# Patient Record
Sex: MALE | Race: BLACK OR AFRICAN AMERICAN
[De-identification: names, ages, dates, MRNs, and addresses within clinical notes are randomized per-mention and may not be internally consistent; named-entity substitution may affect disease eponyms.]

---

## 2018-02-11 ENCOUNTER — HOSPITAL ENCOUNTER (EMERGENCY)
Dept: HOSPITAL 87 - ER | Age: 48
Discharge: LEFT BEFORE DISCHARGE | End: 2018-02-11
Payer: MEDICAID

## 2018-02-11 VITALS — SYSTOLIC BLOOD PRESSURE: 126 MMHG | DIASTOLIC BLOOD PRESSURE: 72 MMHG

## 2018-02-11 VITALS — BODY MASS INDEX: 21.22 KG/M2 | WEIGHT: 165.35 LBS | HEIGHT: 74 IN

## 2018-02-11 DIAGNOSIS — S49.81XA: Primary | ICD-10-CM
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
## 2018-02-11 PROCEDURE — 96372 THER/PROPH/DIAG INJ SC/IM: CPT

## 2018-02-11 PROCEDURE — 99283 EMERGENCY DEPT VISIT LOW MDM: CPT

## 2018-02-15 ENCOUNTER — HOSPITAL ENCOUNTER (EMERGENCY)
Dept: HOSPITAL 87 - ER | Age: 48
Discharge: HOME | End: 2018-02-15
Payer: MEDICAID

## 2018-02-15 VITALS — SYSTOLIC BLOOD PRESSURE: 100 MMHG | DIASTOLIC BLOOD PRESSURE: 60 MMHG

## 2018-02-15 VITALS — WEIGHT: 165.35 LBS | BODY MASS INDEX: 24.49 KG/M2 | HEIGHT: 69 IN

## 2018-02-15 DIAGNOSIS — R10.30: ICD-10-CM

## 2018-02-15 DIAGNOSIS — R36.9: ICD-10-CM

## 2018-02-15 DIAGNOSIS — R56.9: ICD-10-CM

## 2018-02-15 DIAGNOSIS — R19.7: ICD-10-CM

## 2018-02-15 DIAGNOSIS — R30.0: ICD-10-CM

## 2018-02-15 DIAGNOSIS — F17.200: ICD-10-CM

## 2018-02-15 DIAGNOSIS — R31.9: ICD-10-CM

## 2018-02-15 DIAGNOSIS — J06.9: Primary | ICD-10-CM

## 2018-02-15 DIAGNOSIS — R11.2: ICD-10-CM

## 2018-02-15 LAB
APPEARANCE UR: CLEAR
CHLORIDE SERPL-SCNC: 103 MEQ/L (ref 98–107)
COLOR UR: YELLOW
ERYTHROCYTE [DISTWIDTH] IN BLOOD BY AUTOMATED COUNT: 13.8 % (ref 11.6–14.6)
HCT VFR BLD AUTO: 41.9 % (ref 42–52)
HGB BLD-MCNC: 14.1 G/DL (ref 14–18)
HGB UR QL STRIP: NEGATIVE
KETONES UR STRIP-MCNC: NEGATIVE MG/DL
LEUKOCYTE ESTERASE UR QL STRIP: NEGATIVE
LYMPHOCYTES NFR BLD MANUAL: 18 % (ref 20–50)
MCH RBC QN AUTO: 33 PG (ref 28–32)
MCV RBC AUTO: 98.2 FL (ref 80–94)
MONOCYTES NFR BLD MANUAL: 21 % (ref 2–8)
NEUTS SEG NFR BLD MANUAL: 61 % (ref 45–75)
NITRITE UR QL STRIP: NEGATIVE
PH UR STRIP: 5.5 [PH] (ref 4.5–8)
PLATELET # BLD AUTO: 149 X1000/UL (ref 130–400)
PLATELET # BLD EST: NORMAL 10*3/UL
PMV BLD AUTO: 9.9 FL (ref 7.4–10.4)
PROT UR QL STRIP: NEGATIVE
RBC # BLD AUTO: 4.27 MILL/UL (ref 4.7–6.1)
SP GR UR STRIP: 1.03 (ref 1–1.03)
UROBILINOGEN UR STRIP-MCNC: 0.2 E.U./DL (ref 0.2–1)

## 2018-02-15 PROCEDURE — 74176 CT ABD & PELVIS W/O CONTRAST: CPT

## 2018-02-15 PROCEDURE — 99285 EMERGENCY DEPT VISIT HI MDM: CPT

## 2018-02-15 PROCEDURE — 80053 COMPREHEN METABOLIC PANEL: CPT

## 2018-02-15 PROCEDURE — 87186 SC STD MICRODIL/AGAR DIL: CPT

## 2018-02-15 PROCEDURE — 87804 INFLUENZA ASSAY W/OPTIC: CPT

## 2018-02-15 PROCEDURE — 36415 COLL VENOUS BLD VENIPUNCTURE: CPT

## 2018-02-15 PROCEDURE — 96375 TX/PRO/DX INJ NEW DRUG ADDON: CPT

## 2018-02-15 PROCEDURE — 71045 X-RAY EXAM CHEST 1 VIEW: CPT

## 2018-02-15 PROCEDURE — 81003 URINALYSIS AUTO W/O SCOPE: CPT

## 2018-02-15 PROCEDURE — 85025 COMPLETE CBC W/AUTO DIFF WBC: CPT

## 2018-02-15 PROCEDURE — 96374 THER/PROPH/DIAG INJ IV PUSH: CPT

## 2018-02-15 PROCEDURE — 96361 HYDRATE IV INFUSION ADD-ON: CPT

## 2018-06-14 ENCOUNTER — HOSPITAL ENCOUNTER (EMERGENCY)
Age: 48
Discharge: HOME | End: 2018-06-14

## 2018-06-14 ENCOUNTER — HOSPITAL ENCOUNTER (EMERGENCY)
Dept: HOSPITAL 91 - E/R | Age: 48
Discharge: HOME | End: 2018-06-14
Payer: COMMERCIAL

## 2018-06-14 DIAGNOSIS — R40.2142: ICD-10-CM

## 2018-06-14 DIAGNOSIS — F15.10: ICD-10-CM

## 2018-06-14 DIAGNOSIS — R40.2362: ICD-10-CM

## 2018-06-14 DIAGNOSIS — R07.9: Primary | ICD-10-CM

## 2018-06-14 DIAGNOSIS — R40.2252: ICD-10-CM

## 2018-06-14 DIAGNOSIS — F20.9: ICD-10-CM

## 2018-06-14 LAB
ADD MAN DIFF?: NO
ALANINE AMINOTRANSFERASE: 26 IU/L (ref 13–69)
ALBUMIN/GLOBULIN RATIO: 1.15
ALBUMIN: 3.8 G/DL (ref 3.3–4.9)
ALKALINE PHOSPHATASE: 110 IU/L (ref 42–121)
ANION GAP: 10 (ref 8–16)
ASPARTATE AMINO TRANSFERASE: 13 IU/L (ref 15–46)
BASOPHIL #: 0.1 10^3/UL (ref 0–0.1)
BASOPHILS %: 0.7 % (ref 0–2)
BILIRUBIN,DIRECT: 0 MG/DL (ref 0–0.2)
BILIRUBIN,TOTAL: 0.3 MG/DL (ref 0.2–1.3)
BLOOD UREA NITROGEN: 18 MG/DL (ref 7–20)
CALCIUM: 9.1 MG/DL (ref 8.4–10.2)
CARBON DIOXIDE: 29 MMOL/L (ref 21–31)
CHLORIDE: 104 MMOL/L (ref 97–110)
CREATININE: 0.9 MG/DL (ref 0.61–1.24)
EOSINOPHILS #: 0.2 10^3/UL (ref 0–0.5)
EOSINOPHILS %: 2.5 % (ref 0–7)
GLOBULIN: 3.3 G/DL (ref 1.3–3.2)
GLUCOSE: 83 MG/DL (ref 70–220)
HEMATOCRIT: 41.3 % (ref 42–52)
HEMOGLOBIN: 13.8 G/DL (ref 14–18)
LIPASE: 99 U/L (ref 23–300)
LYMPHOCYTES #: 1.5 10^3/UL (ref 0.8–2.9)
LYMPHOCYTES %: 19.8 % (ref 15–51)
MEAN CORPUSCULAR HEMOGLOBIN: 32.9 PG (ref 29–33)
MEAN CORPUSCULAR HGB CONC: 33.4 G/DL (ref 32–37)
MEAN CORPUSCULAR VOLUME: 98.3 FL (ref 82–101)
MEAN PLATELET VOLUME: 11.3 FL (ref 7.4–10.4)
MONOCYTE #: 0.9 10^3/UL (ref 0.3–0.9)
MONOCYTES %: 11.6 % (ref 0–11)
NEUTROPHIL #: 4.8 10^3/UL (ref 1.6–7.5)
NEUTROPHILS %: 65 % (ref 39–77)
NUCLEATED RED BLOOD CELLS #: 0 10^3/UL (ref 0–0)
NUCLEATED RED BLOOD CELLS%: 0 /100WBC (ref 0–0)
PLATELET COUNT: 217 10^3/UL (ref 140–415)
POTASSIUM: 4.1 MMOL/L (ref 3.5–5.1)
RED BLOOD COUNT: 4.2 10^6/UL (ref 4.7–6.1)
RED CELL DISTRIBUTION WIDTH: 12.2 % (ref 11.5–14.5)
SODIUM: 139 MMOL/L (ref 135–144)
TOTAL PROTEIN: 7.1 G/DL (ref 6.1–8.1)
TROPONIN-I: < 0.01 NG/ML (ref 0–0.12)
WHITE BLOOD COUNT: 7.3 10^3/UL (ref 4.8–10.8)

## 2018-06-14 PROCEDURE — 83690 ASSAY OF LIPASE: CPT

## 2018-06-14 PROCEDURE — 36415 COLL VENOUS BLD VENIPUNCTURE: CPT

## 2018-06-14 PROCEDURE — 99285 EMERGENCY DEPT VISIT HI MDM: CPT

## 2018-06-14 PROCEDURE — 71045 X-RAY EXAM CHEST 1 VIEW: CPT

## 2018-06-14 PROCEDURE — 85025 COMPLETE CBC W/AUTO DIFF WBC: CPT

## 2018-06-14 PROCEDURE — 84484 ASSAY OF TROPONIN QUANT: CPT

## 2018-06-14 PROCEDURE — 93005 ELECTROCARDIOGRAM TRACING: CPT

## 2018-06-14 PROCEDURE — 80053 COMPREHEN METABOLIC PANEL: CPT

## 2018-06-14 RX ADMIN — THIAMINE HYDROCHLORIDE 1 MLS/HR: 100 INJECTION, SOLUTION INTRAMUSCULAR; INTRAVENOUS at 13:45

## 2020-05-25 ENCOUNTER — HOSPITAL ENCOUNTER (EMERGENCY)
Dept: HOSPITAL 54 - ER | Age: 50
LOS: 1 days | Discharge: PSYCHIATRIC OR SUBSTANCE USE FACILITY | End: 2020-05-26
Payer: COMMERCIAL

## 2020-05-25 VITALS — BODY MASS INDEX: 25.48 KG/M2 | HEIGHT: 69 IN | WEIGHT: 172 LBS

## 2020-05-25 DIAGNOSIS — F20.9: ICD-10-CM

## 2020-05-25 DIAGNOSIS — G40.909: ICD-10-CM

## 2020-05-25 DIAGNOSIS — F32.9: ICD-10-CM

## 2020-05-25 DIAGNOSIS — F19.10: ICD-10-CM

## 2020-05-25 DIAGNOSIS — R45.851: Primary | ICD-10-CM

## 2020-05-25 LAB
ALBUMIN SERPL BCP-MCNC: 3.8 G/DL (ref 3.4–5)
ALP SERPL-CCNC: 94 U/L (ref 46–116)
ALT SERPL W P-5'-P-CCNC: 45 U/L (ref 12–78)
APAP SERPL-MCNC: 0 UG/ML (ref 10–30)
AST SERPL W P-5'-P-CCNC: 87 U/L (ref 15–37)
BASOPHILS # BLD AUTO: 0.1 /CMM (ref 0–0.2)
BASOPHILS NFR BLD AUTO: 1.2 % (ref 0–2)
BILIRUB DIRECT SERPL-MCNC: 0.1 MG/DL (ref 0–0.2)
BILIRUB SERPL-MCNC: 0.3 MG/DL (ref 0.2–1)
BUN SERPL-MCNC: 15 MG/DL (ref 7–18)
CALCIUM SERPL-MCNC: 8.8 MG/DL (ref 8.5–10.1)
CHLORIDE SERPL-SCNC: 105 MMOL/L (ref 98–107)
CO2 SERPL-SCNC: 29 MMOL/L (ref 21–32)
CREAT SERPL-MCNC: 1.2 MG/DL (ref 0.6–1.3)
EOSINOPHIL NFR BLD AUTO: 3.4 % (ref 0–6)
ETHANOL SERPL-MCNC: < 3 MG/DL (ref 0–0)
GLUCOSE SERPL-MCNC: 97 MG/DL (ref 74–106)
HCT VFR BLD AUTO: 40 % (ref 39–51)
HGB BLD-MCNC: 13.3 G/DL (ref 13.5–17.5)
LYMPHOCYTES NFR BLD AUTO: 1.9 /CMM (ref 0.8–4.8)
LYMPHOCYTES NFR BLD AUTO: 35 % (ref 20–44)
MCHC RBC AUTO-ENTMCNC: 34 G/DL (ref 31–36)
MCV RBC AUTO: 100 FL (ref 80–96)
MONOCYTES NFR BLD AUTO: 0.5 /CMM (ref 0.1–1.3)
MONOCYTES NFR BLD AUTO: 9.3 % (ref 2–12)
NEUTROPHILS # BLD AUTO: 2.8 /CMM (ref 1.8–8.9)
NEUTROPHILS NFR BLD AUTO: 51.1 % (ref 43–81)
PH UR STRIP: 7 [PH] (ref 5–8)
PLATELET # BLD AUTO: 207 /CMM (ref 150–450)
POTASSIUM SERPL-SCNC: 4 MMOL/L (ref 3.5–5.1)
PROT SERPL-MCNC: 6.7 G/DL (ref 6.4–8.2)
RBC # BLD AUTO: 3.98 MIL/UL (ref 4.5–6)
SALICYLATES SERPL-MCNC: 3.1 MG/DL (ref 2.8–20)
SODIUM SERPL-SCNC: 141 MMOL/L (ref 136–145)
UROBILINOGEN UR STRIP-MCNC: >=8 EU/DL
WBC NRBC COR # BLD AUTO: 5.5 K/UL (ref 4.3–11)

## 2020-05-25 PROCEDURE — 80076 HEPATIC FUNCTION PANEL: CPT

## 2020-05-25 PROCEDURE — 99285 EMERGENCY DEPT VISIT HI MDM: CPT

## 2020-05-25 PROCEDURE — 85025 COMPLETE CBC W/AUTO DIFF WBC: CPT

## 2020-05-25 PROCEDURE — 81001 URINALYSIS AUTO W/SCOPE: CPT

## 2020-05-25 PROCEDURE — 80305 DRUG TEST PRSMV DIR OPT OBS: CPT

## 2020-05-25 PROCEDURE — G0480 DRUG TEST DEF 1-7 CLASSES: HCPCS

## 2020-05-25 PROCEDURE — 80329 ANALGESICS NON-OPIOID 1 OR 2: CPT

## 2020-05-25 PROCEDURE — 80307 DRUG TEST PRSMV CHEM ANLYZR: CPT

## 2020-05-25 PROCEDURE — 36415 COLL VENOUS BLD VENIPUNCTURE: CPT

## 2020-05-25 PROCEDURE — 80048 BASIC METABOLIC PNL TOTAL CA: CPT

## 2020-05-25 NOTE — NUR
PT CAME TO THE ED C/O SI W/ A PLAN TO OVERDOSE HIMSELF W/ HIS MEDICATIONS. 
DENIES HI. PT AAOX4, RESPIRATIONS EVEN AND UNLABORED ON RA W/ NAD NOTED. PT 
CHANGED INTO GOWN, CONNECTED TO THE MONITOR, BELONGINGS TO LOCKER, SUICIDE 
PRECAUTIONS IMPLEMENTED. SITTER AT BEDSIDE FOR SAFETY

## 2020-05-26 VITALS — DIASTOLIC BLOOD PRESSURE: 91 MMHG | SYSTOLIC BLOOD PRESSURE: 137 MMHG

## 2020-05-26 NOTE — NUR
CALLED [facility] TO FOLLOW UP ABOUT ETA [phone] OPT[facility] 2 SPOKE WITH [facility].

## 2020-05-26 NOTE — NUR
CALLED [facility] FOR FOLLOW UP ON ETA ON BLS TRANSPORT. STATED THE BLS TRANSPORT 
THAT IS SUPPOSED TO  OUR PATIENT IS STILL AT [facility] 
TRANSPORTING ANOTHER PT THAT WE SENT, THEYRE WAITING TO TEST THEIR PT FOR 
COVID. ALSO STATED THAT ONCE THEY TRANSFER THEIR PT THEY WILL COME  OUR 
PT.

## 2020-05-26 NOTE — NUR
PT ACCEPTED AT [facility]

PHONE NUMBER FOR REPORT [phone] EXT [phone]

ACCEPTING [facility]

CALL REPORT TO UBALDO BONILLA

## 2020-07-07 ENCOUNTER — HOSPITAL ENCOUNTER (EMERGENCY)
Dept: HOSPITAL 54 - ER | Age: 50
Discharge: PSYCHIATRIC OR SUBSTANCE USE FACILITY | End: 2020-07-07
Payer: COMMERCIAL

## 2020-07-07 VITALS — WEIGHT: 160 LBS | HEIGHT: 70 IN | BODY MASS INDEX: 22.9 KG/M2

## 2020-07-07 VITALS — SYSTOLIC BLOOD PRESSURE: 122 MMHG | DIASTOLIC BLOOD PRESSURE: 68 MMHG
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
## 2020-07-07 DIAGNOSIS — R56.9: ICD-10-CM

## 2020-07-07 DIAGNOSIS — R45.851: ICD-10-CM

## 2020-07-07 DIAGNOSIS — F12.10: ICD-10-CM

## 2020-07-07 DIAGNOSIS — Z59.0: ICD-10-CM

## 2020-07-07 DIAGNOSIS — T73.0XXA: Primary | ICD-10-CM

## 2020-07-07 DIAGNOSIS — F14.10: ICD-10-CM

## 2020-07-07 DIAGNOSIS — F20.0: ICD-10-CM

## 2020-07-07 DIAGNOSIS — F15.10: ICD-10-CM

## 2020-07-07 LAB
ALBUMIN SERPL BCP-MCNC: 3.8 G/DL (ref 3.4–5)
ALP SERPL-CCNC: 97 U/L (ref 46–116)
ALT SERPL W P-5'-P-CCNC: 38 U/L (ref 12–78)
APAP SERPL-MCNC: 0 UG/ML (ref 10–30)
AST SERPL W P-5'-P-CCNC: 15 U/L (ref 15–37)
BASOPHILS # BLD AUTO: 0.1 /CMM (ref 0–0.2)
BASOPHILS NFR BLD AUTO: 1.2 % (ref 0–2)
BILIRUB DIRECT SERPL-MCNC: 0.1 MG/DL (ref 0–0.2)
BILIRUB SERPL-MCNC: 0.4 MG/DL (ref 0.2–1)
BUN SERPL-MCNC: 16 MG/DL (ref 7–18)
CALCIUM SERPL-MCNC: 9 MG/DL (ref 8.5–10.1)
CHLORIDE SERPL-SCNC: 104 MMOL/L (ref 98–107)
CO2 SERPL-SCNC: 30 MMOL/L (ref 21–32)
CREAT SERPL-MCNC: 1.1 MG/DL (ref 0.6–1.3)
EOSINOPHIL NFR BLD AUTO: 4 % (ref 0–6)
ETHANOL SERPL-MCNC: < 3 MG/DL (ref 0–0)
GLUCOSE SERPL-MCNC: 88 MG/DL (ref 74–106)
HCT VFR BLD AUTO: 41 % (ref 39–51)
HGB BLD-MCNC: 14 G/DL (ref 13.5–17.5)
LYMPHOCYTES NFR BLD AUTO: 1.3 /CMM (ref 0.8–4.8)
LYMPHOCYTES NFR BLD AUTO: 30.8 % (ref 20–44)
MCHC RBC AUTO-ENTMCNC: 34 G/DL (ref 31–36)
MCV RBC AUTO: 101 FL (ref 80–96)
MONOCYTES NFR BLD AUTO: 0.5 /CMM (ref 0.1–1.3)
MONOCYTES NFR BLD AUTO: 12.7 % (ref 2–12)
NEUTROPHILS # BLD AUTO: 2.2 /CMM (ref 1.8–8.9)
NEUTROPHILS NFR BLD AUTO: 51.3 % (ref 43–81)
PLATELET # BLD AUTO: 205 /CMM (ref 150–450)
POTASSIUM SERPL-SCNC: 3.3 MMOL/L (ref 3.5–5.1)
PROT SERPL-MCNC: 7 G/DL (ref 6.4–8.2)
RBC # BLD AUTO: 4.11 MIL/UL (ref 4.5–6)
SALICYLATES SERPL-MCNC: 2.6 MG/DL (ref 2.8–20)
SODIUM SERPL-SCNC: 141 MMOL/L (ref 136–145)
WBC NRBC COR # BLD AUTO: 4.3 K/UL (ref 4.3–11)

## 2020-07-07 PROCEDURE — 80329 ANALGESICS NON-OPIOID 1 OR 2: CPT

## 2020-07-07 PROCEDURE — 85025 COMPLETE CBC W/AUTO DIFF WBC: CPT

## 2020-07-07 PROCEDURE — 36415 COLL VENOUS BLD VENIPUNCTURE: CPT

## 2020-07-07 PROCEDURE — 80048 BASIC METABOLIC PNL TOTAL CA: CPT

## 2020-07-07 PROCEDURE — 99285 EMERGENCY DEPT VISIT HI MDM: CPT

## 2020-07-07 PROCEDURE — 80305 DRUG TEST PRSMV DIR OPT OBS: CPT

## 2020-07-07 PROCEDURE — G0480 DRUG TEST DEF 1-7 CLASSES: HCPCS

## 2020-07-07 PROCEDURE — 80076 HEPATIC FUNCTION PANEL: CPT

## 2020-07-07 PROCEDURE — 80307 DRUG TEST PRSMV CHEM ANLYZR: CPT

## 2020-07-07 SDOH — ECONOMIC STABILITY - HOUSING INSECURITY: HOMELESSNESS: Z59.0

## 2020-07-07 NOTE — NUR
PT BIB SELF C/O SI " I WANT TO RUN THRU TRAFFIC" PT IS AAOX4, NOT IN 
RESPIRATORY DISTRESS, V/S STABLE, KEPT RESTED AND COMFORTABLE. WILL CONTINUE TO 
MONITOR. SITTER AT BEDSIDE.

## 2020-07-07 NOTE — NUR
Pt is a 50-year-old male who came to [facility] requesting voluntary psychiatric hospitalization. 
Pt reports he is suicidal with a plan. PATRICIOW contacted Vini at [facility] and initiated voluntary 
hospitalization. PATRICIOW conducted chart review and faxed clinicals to [facility] intake. CELSO 
received a callback from Nisha at intake stating she is missing the medical clearance. 
PATRICIOW requested for her to contact ED since medical clearance note was not done as of yet by 
MD. PATRICIOW updated CRN Gener in ED with aforementioned information.

## 2020-08-18 ENCOUNTER — HOSPITAL ENCOUNTER (EMERGENCY)
Dept: HOSPITAL 54 - ER | Age: 50
Discharge: PSYCHIATRIC OR SUBSTANCE USE FACILITY | End: 2020-08-18
Payer: COMMERCIAL

## 2020-08-18 VITALS — HEIGHT: 69 IN | WEIGHT: 160 LBS | BODY MASS INDEX: 23.7 KG/M2

## 2020-08-18 VITALS — DIASTOLIC BLOOD PRESSURE: 69 MMHG | SYSTOLIC BLOOD PRESSURE: 123 MMHG

## 2020-08-18 DIAGNOSIS — F20.9: ICD-10-CM

## 2020-08-18 DIAGNOSIS — R45.851: Primary | ICD-10-CM

## 2020-08-18 LAB
ALBUMIN SERPL BCP-MCNC: 3.8 G/DL (ref 3.4–5)
ALP SERPL-CCNC: 90 U/L (ref 46–116)
ALT SERPL W P-5'-P-CCNC: 18 U/L (ref 12–78)
APAP SERPL-MCNC: 0 UG/ML (ref 10–30)
APPEARANCE UR: CLEAR
AST SERPL W P-5'-P-CCNC: 10 U/L (ref 15–37)
BASOPHILS # BLD AUTO: 0.1 /CMM (ref 0–0.2)
BASOPHILS NFR BLD AUTO: 1.1 % (ref 0–2)
BILIRUB DIRECT SERPL-MCNC: 0.1 MG/DL (ref 0–0.2)
BILIRUB SERPL-MCNC: 0.3 MG/DL (ref 0.2–1)
BILIRUB UR QL STRIP: (no result)
BUN SERPL-MCNC: 15 MG/DL (ref 7–18)
CALCIUM SERPL-MCNC: 8.8 MG/DL (ref 8.5–10.1)
CHLORIDE SERPL-SCNC: 102 MMOL/L (ref 98–107)
CO2 SERPL-SCNC: 24 MMOL/L (ref 21–32)
COLOR UR: YELLOW
CREAT SERPL-MCNC: 1.3 MG/DL (ref 0.6–1.3)
EOSINOPHIL NFR BLD AUTO: 3.3 % (ref 0–6)
ETHANOL SERPL-MCNC: < 3 MG/DL (ref 0–0)
GLUCOSE SERPL-MCNC: 125 MG/DL (ref 74–106)
GLUCOSE UR STRIP-MCNC: NEGATIVE MG/DL
HCT VFR BLD AUTO: 42 % (ref 39–51)
HGB BLD-MCNC: 13.8 G/DL (ref 13.5–17.5)
HGB UR QL STRIP: (no result) ERY/UL
KETONES UR STRIP-MCNC: NEGATIVE MG/DL
LEUKOCYTE ESTERASE UR QL STRIP: NEGATIVE
LYMPHOCYTES NFR BLD AUTO: 1.9 /CMM (ref 0.8–4.8)
LYMPHOCYTES NFR BLD AUTO: 34.2 % (ref 20–44)
MCHC RBC AUTO-ENTMCNC: 33 G/DL (ref 31–36)
MCV RBC AUTO: 100 FL (ref 80–96)
MONOCYTES NFR BLD AUTO: 0.6 /CMM (ref 0.1–1.3)
MONOCYTES NFR BLD AUTO: 11.1 % (ref 2–12)
NEUTROPHILS # BLD AUTO: 2.8 /CMM (ref 1.8–8.9)
NEUTROPHILS NFR BLD AUTO: 50.3 % (ref 43–81)
NITRITE UR QL STRIP: NEGATIVE
PH UR STRIP: 5.5 [PH] (ref 5–8)
PLATELET # BLD AUTO: 230 /CMM (ref 150–450)
POTASSIUM SERPL-SCNC: 3.4 MMOL/L (ref 3.5–5.1)
PROT SERPL-MCNC: 6.9 G/DL (ref 6.4–8.2)
PROT UR QL STRIP: NEGATIVE MG/DL
RBC # BLD AUTO: 4.18 MIL/UL (ref 4.5–6)
RBC #/AREA URNS HPF: (no result) /HPF (ref 0–2)
SALICYLATES SERPL-MCNC: 4.1 MG/DL (ref 2.8–20)
SODIUM SERPL-SCNC: 137 MMOL/L (ref 136–145)
UROBILINOGEN UR STRIP-MCNC: 1 EU/DL
WBC #/AREA URNS HPF: (no result) /HPF (ref 0–3)
WBC NRBC COR # BLD AUTO: 5.7 K/UL (ref 4.3–11)

## 2020-08-18 PROCEDURE — 36415 COLL VENOUS BLD VENIPUNCTURE: CPT

## 2020-08-18 PROCEDURE — 80305 DRUG TEST PRSMV DIR OPT OBS: CPT

## 2020-08-18 PROCEDURE — 80048 BASIC METABOLIC PNL TOTAL CA: CPT

## 2020-08-18 PROCEDURE — 81001 URINALYSIS AUTO W/SCOPE: CPT

## 2020-08-18 PROCEDURE — 99285 EMERGENCY DEPT VISIT HI MDM: CPT

## 2020-08-18 PROCEDURE — 80329 ANALGESICS NON-OPIOID 1 OR 2: CPT

## 2020-08-18 PROCEDURE — G0480 DRUG TEST DEF 1-7 CLASSES: HCPCS

## 2020-08-18 PROCEDURE — 80307 DRUG TEST PRSMV CHEM ANLYZR: CPT

## 2020-08-18 PROCEDURE — 80076 HEPATIC FUNCTION PANEL: CPT

## 2020-08-18 PROCEDURE — 85025 COMPLETE CBC W/AUTO DIFF WBC: CPT

## 2020-08-18 NOTE — NUR
ASSESSED PT ON BED ASLEEP BUT EASILY AROUSABLE. NOT IN RESPIRATORY DISTRESS, 
V/S STABLE, KEPT RESTED AND COMFORTABLE. WILL CONTINUE TO MONITOR.

## 2020-08-18 NOTE — NUR
8:45am This SW faxed over clinicals to Vini at [facility] [phone]. This SW 
received confirmation of the fax. This SW awaiting confirmation of bed availability.

## 2020-08-18 NOTE — NUR
BIBSELF C/O SI TO JUMP INFRONT OF TRAFFIC. -HI. PT AAOX4. AMBULATORY WITH 
STEADY GAIT. PLACED IN BED 15, ON MONITOR, AND PULSE OX. PT BELONINGS PLACED IN 
LOCKER.

## 2020-08-18 NOTE — NUR
8:30am: 



 met with the patient at bedside. Patient is a 50-year-old  
Male. Patient was alert and orientated x4 and receptive to speaking with this . 
Patient made little eye contact with this  and when redirected to speak up 
patient did so. Patient was able to confirm demographics on the face sheet. Patient resides 
with his father Gene at [address] [place] [address]. Patient reports the use of 
a wheelchair in the home as he has pain in his leg, patient does not report having a walker 
or a cane in addition to the wheelchair. Patient currently receives [employer] benefits, 
approximately $200. Patient reports alcohol use, currently drinking once a day approximately 
drinking 15 beers. Patient reports the use of marijuana every day. Patient also reports 
smoking cigarettes, 2 packs a day. Patient reports a schizophrenic diagnosis currently 
taking Abilify and Seroquel. Patient reports visual hallucinations to kill my mom and pt 
reports audio hallucinations to kill myself. Patients presents with a suicidal ideation to 
run into traffic. Patient denies having homicidal ideations outside of visual 
hallucinations. This SW spoke to the patient about treatment plan and patient agreed to 
treatment including a voluntary psychiatric admission. 



This SW to remain available for all patient needs.

## 2020-08-18 NOTE — NUR
SPOKE TO BIRD FROM [facility] TRANSPORT ARRANGED, CONFIRMATION NUMBER: 
[phone]

WAITING FOR CALL BACK FOR ETA.

## 2020-08-18 NOTE — NUR
called [facility], spoke with Daquan, said they received fax but will 
follow up with bed availability

## 2021-06-27 NOTE — NUR
PT UPON DC FOR CELLULITIS. PT STATED THAT HE'S SUICIDAL & THOUGHTS OF OD ON 
PILLS, NOTIFIED ERMJACINTA. SITTER AT BS. PT CALM & COOPERATIVE AT THIS TIME. WILL 
CONT TO MONITOR.

## 2021-06-27 NOTE — NUR
PT WATCHING TV. RR EVEN & UNLABORED. CALM & COOPERATIVE, NAD NOTED AT THIS 
TIME. SITTER AT BS. WILL CONT TO MONITOR.

## 2021-07-28 NOTE — NUR
PT SELF PRESENTS TO ED. STEADY GAIT. C/O SUICIDAL IDEATION X 1 WEEK. DENIES HI. 
DENIES ANY PAIN OR ANY DISCOMFORT PTA. WILLING TO GO VOLUNTARY TO A PSYCH 
FACILITY. STATES NOT ON ANY CURRENT MEDICATION. STABLE VITALS. COOPERATIVE TO 
STAFF. AWAITING MD ALEX.

## 2021-07-28 NOTE — NUR
TRANSFER INFORMATION

PT ACCEPTED AT [facility]

ACCEPTING MD FRAGA

PHONE NUMBER FOR REPORT [phone]

PT WILL GO TO UNIT 2

## 2021-07-28 NOTE — NUR
Report given to Lucie CONNER for jaron

-------------------------------------------------------------------------------

Addendum: 07/28/21 at 2056 by DAMEON

-------------------------------------------------------------------------------

From [facility]

## 2021-08-24 NOTE — NUR
TO ER BED 15 REQUESTING MEDICAL CLEARANCE FOR VOLUNTARY PSYCH ADMISSION. PT C/O 
SI WITH PLAN TO RUN INTO TRAFFIC. DENIES HI. PT AAOX4 NO ACUTE DISTRESS NOTED, 
RESP EVEN AND UNLABORED. PT CALM AND COOPERATIVE AT THIS TIME. PLACE PT ON 
HOSPITAL GOWN, ALL BELONGINGS REMOVED AND PLACED IN A LOCKED HOSPITAL LOCKER. 
1:1 SITTER AT BEDSIDE FOR PT SAFETY.

## 2021-08-24 NOTE — NUR
PT ASLEEP, EASILY AROUSABLE. NO ACUTE DISTRESS NOTED, RESP EVEN AND UNLABORED. 
NO PAIN OR DISCOMFORT AT THIS TIME. PT STILL UNABLE TO PROVIDE URINE SAMPLE AT 
THIS TIME. ER MD MADE AWARE.
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
## 2021-08-25 NOTE — NUR
Report given to emts. patient transferred to [facility] in stable 
condition. Belongings given to patient and EMT.

## 2021-08-25 NOTE — NUR
Noted left arm has a dry wound with stiches. The patient did not let further 
evaluation of the wound but stated that there are 7 stiches that were placed 
yesterday at a hospital in [place]. According to the patient he obtained the 
cut on his left arm after having a fall. The patient states that it was an 
accident. Denies pain. No s/s infection noted. ER Dr Santoro was made aware of 
the wound on the arm (see MD notes).
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
## 2021-09-18 NOTE — NUR
patient left in stable condition, denies any pain, picked up by private 
ambulance going to [facility].

## 2022-05-22 ENCOUNTER — HOSPITAL ENCOUNTER (EMERGENCY)
Dept: HOSPITAL 54 - ER | Age: 52
Discharge: PSYCHIATRIC OR SUBSTANCE USE FACILITY | End: 2022-05-22
Payer: COMMERCIAL

## 2022-05-22 VITALS — BODY MASS INDEX: 23.91 KG/M2 | WEIGHT: 167 LBS | HEIGHT: 70 IN

## 2022-05-22 VITALS — SYSTOLIC BLOOD PRESSURE: 130 MMHG | DIASTOLIC BLOOD PRESSURE: 77 MMHG

## 2022-05-22 DIAGNOSIS — F32.A: ICD-10-CM

## 2022-05-22 DIAGNOSIS — F20.0: ICD-10-CM

## 2022-05-22 DIAGNOSIS — R45.851: Primary | ICD-10-CM

## 2022-05-22 DIAGNOSIS — F19.10: ICD-10-CM

## 2022-05-22 DIAGNOSIS — F17.200: ICD-10-CM

## 2022-05-22 LAB
ALBUMIN SERPL BCP-MCNC: 4.4 G/DL (ref 3.4–5)
ALP SERPL-CCNC: 118 U/L (ref 46–116)
ALT SERPL W P-5'-P-CCNC: 18 U/L (ref 12–78)
APAP SERPL-MCNC: 0 UG/ML (ref 10–30)
AST SERPL W P-5'-P-CCNC: 8 U/L (ref 15–37)
BASOPHILS # BLD AUTO: 0.1 K/UL (ref 0–0.2)
BASOPHILS NFR BLD AUTO: 3 % (ref 0–2)
BILIRUB DIRECT SERPL-MCNC: 0.1 MG/DL (ref 0–0.2)
BILIRUB SERPL-MCNC: 0.4 MG/DL (ref 0.2–1)
BILIRUB UR QL STRIP: (no result)
BUN SERPL-MCNC: 16 MG/DL (ref 7–18)
CALCIUM SERPL-MCNC: 9.4 MG/DL (ref 8.5–10.1)
CHLORIDE SERPL-SCNC: 104 MMOL/L (ref 98–107)
CO2 SERPL-SCNC: 31 MMOL/L (ref 21–32)
COLOR UR: (no result)
CREAT SERPL-MCNC: 1.3 MG/DL (ref 0.6–1.3)
EOSINOPHIL NFR BLD AUTO: 6.4 % (ref 0–6)
ETHANOL SERPL-MCNC: < 3 MG/DL (ref 0–0)
GLUCOSE SERPL-MCNC: 69 MG/DL (ref 74–106)
GLUCOSE UR STRIP-MCNC: NEGATIVE MG/DL
HCT VFR BLD AUTO: 44 % (ref 39–51)
HGB BLD-MCNC: 14.6 G/DL (ref 13.5–17.5)
LEUKOCYTE ESTERASE UR QL STRIP: NEGATIVE
LYMPHOCYTES NFR BLD AUTO: 1.2 K/UL (ref 0.8–4.8)
LYMPHOCYTES NFR BLD AUTO: 38 % (ref 20–44)
MCHC RBC AUTO-ENTMCNC: 33 G/DL (ref 31–36)
MCV RBC AUTO: 101 FL (ref 80–96)
MONOCYTES NFR BLD AUTO: 0.4 K/UL (ref 0.1–1.3)
MONOCYTES NFR BLD AUTO: 13.5 % (ref 2–12)
NEUTROPHILS # BLD AUTO: 1.2 K/UL (ref 1.8–8.9)
NEUTROPHILS NFR BLD AUTO: 39.1 % (ref 43–81)
NITRITE UR QL STRIP: NEGATIVE
PH UR STRIP: 5.5 [PH] (ref 5–8)
PLATELET # BLD AUTO: 215 K/UL (ref 150–450)
POTASSIUM SERPL-SCNC: 3.8 MMOL/L (ref 3.5–5.1)
PROT SERPL-MCNC: 8.1 G/DL (ref 6.4–8.2)
PROT UR QL STRIP: NEGATIVE MG/DL
RBC # BLD AUTO: 4.39 MIL/UL (ref 4.5–6)
RBC #/AREA URNS HPF: (no result) /HPF (ref 0–2)
SODIUM SERPL-SCNC: 140 MMOL/L (ref 136–145)
UROBILINOGEN UR STRIP-MCNC: 1 EU/DL
WBC #/AREA URNS HPF: (no result) /HPF (ref 0–3)
WBC NRBC COR # BLD AUTO: 3.1 K/UL (ref 4.3–11)

## 2022-05-22 PROCEDURE — 85025 COMPLETE CBC W/AUTO DIFF WBC: CPT

## 2022-05-22 PROCEDURE — 36415 COLL VENOUS BLD VENIPUNCTURE: CPT

## 2022-05-22 PROCEDURE — 80320 DRUG SCREEN QUANTALCOHOLS: CPT

## 2022-05-22 PROCEDURE — G0480 DRUG TEST DEF 1-7 CLASSES: HCPCS

## 2022-05-22 PROCEDURE — C9803 HOPD COVID-19 SPEC COLLECT: HCPCS

## 2022-05-22 PROCEDURE — 87426 SARSCOV CORONAVIRUS AG IA: CPT

## 2022-05-22 PROCEDURE — 81001 URINALYSIS AUTO W/SCOPE: CPT

## 2022-05-22 PROCEDURE — 99285 EMERGENCY DEPT VISIT HI MDM: CPT

## 2022-05-22 PROCEDURE — 80048 BASIC METABOLIC PNL TOTAL CA: CPT

## 2022-05-22 PROCEDURE — 80076 HEPATIC FUNCTION PANEL: CPT

## 2022-05-22 PROCEDURE — 80143 DRUG ASSAY ACETAMINOPHEN: CPT

## 2022-05-22 PROCEDURE — 80307 DRUG TEST PRSMV CHEM ANLYZR: CPT

## 2022-05-22 NOTE — NUR
PT BIB SELF C/O SI "I WANT TO RUN THRU TRAFFIC" REQUESTING VOLUNTARY PSYCH 
ADMISSION TO [facility]. PT IS AAOX4, NOT IN RESPIRATORY DISTRESS, V/S 
STABLE, KEPT RESTED AND COMFORT. SITTER AT BEDSIDE.

## 2022-05-22 NOTE — NUR
ACCEPTED TO [facility] UNDER THE CARE OF DR. DUNN (PSYCH) AND DR. VARGAS 
(MEDICAL). NUMBER TO GIVE REPORT [phone]. 



PRIMARY NURSE AWARE.

## 2022-05-22 NOTE — NUR
-------------------------------------------------------------------------------

          *** Note undone in [facility] - 05/22/22 at 1856 by BRANDY ***           

-------------------------------------------------------------------------------

REPORT GIVEN TO EMT FOR PT TRANSFER TO  [facility].

## 2025-06-18 NOTE — NUR
REPORT GIVEN TO EMT FOR PT TRANSFER TO [facility]. without increased left knee pain     Patient goals: Improve stamina and ability to walk       Pt. Education:  [x] Yes  [] No  [x] Reviewed Prior HEP/Ed  Method of Education: [x] Verbal  [x] Demo  [] Written  Comprehension of Education:  [x] Verbalizes understanding.  [] Demonstrates understanding.  [x] Needs review.  [] Demonstrates/verbalizes HEP/Ed previously given.    ccess Code: [email or web address]  URL: [email or web address]  Date: 06/10/2025  Prepared by: Gray Howell     Exercises  - Supine Straight Leg Raises  - 2 x daily - 7 x weekly - 1 sets - 10 reps  - Sidelying Hip Abduction  - 2 x daily - 7 x weekly - 1 sets - 10 reps  - Prone Hip Extension  - 2 x daily - 7 x weekly - 1 sets - 10 reps  - Sidelying Hip Adduction  - 2 x daily - 7 x weekly - 1 sets - 10 reps  - Supine Hip Adduction Isometric with Ball  - 2 x daily - 7 x weekly - 1 sets - 10 reps - 2 seconds hold  - Long Sitting Calf Stretch with Strap  - 2 x daily - 7 x weekly - 1 sets - 4 reps - 15 seconds hold  - Straight Leg Raise with External Rotation  - 2 x daily - 7 x weekly - 1 sets - 10 reps    Access Code: [email or web address]  URL: [email or web address]  Date: 06/12/2025  Prepared by: Gray Howell    Exercises    - Modified Faustino Stretch  - 2 x daily - 7 x weekly - 1 sets - 2 reps - 30 seconds hold  - Step Up  - 2 x daily - 7 x weekly - 1 sets - 15 reps  - Standing Lateral Step-Down Heel Tap  - 2 x daily - 7 x weekly - 1 sets - 10-15 reps        Plan: [x] Continue current frequency toward long and short term goals.          Time In: 0800 am           Time Out:   0843 am   Electronically signed by:  Chelsea Lynn PTA